# Patient Record
Sex: MALE | Race: OTHER | HISPANIC OR LATINO | URBAN - METROPOLITAN AREA
[De-identification: names, ages, dates, MRNs, and addresses within clinical notes are randomized per-mention and may not be internally consistent; named-entity substitution may affect disease eponyms.]

---

## 2023-05-01 ENCOUNTER — EMERGENCY (EMERGENCY)
Facility: HOSPITAL | Age: 53
LOS: 1 days | Discharge: HOME | End: 2023-05-01
Attending: EMERGENCY MEDICINE
Payer: COMMERCIAL

## 2023-05-01 VITALS
TEMPERATURE: 98 F | SYSTOLIC BLOOD PRESSURE: 190 MMHG | WEIGHT: 210.1 LBS | HEART RATE: 66 BPM | HEIGHT: 67 IN | OXYGEN SATURATION: 98 % | DIASTOLIC BLOOD PRESSURE: 116 MMHG | RESPIRATION RATE: 18 BRPM

## 2023-05-01 VITALS
HEART RATE: 76 BPM | SYSTOLIC BLOOD PRESSURE: 194 MMHG | RESPIRATION RATE: 16 BRPM | DIASTOLIC BLOOD PRESSURE: 137 MMHG | OXYGEN SATURATION: 96 %

## 2023-05-01 LAB
ALBUMIN SERPL ELPH-MCNC: 4.6 G/DL — SIGNIFICANT CHANGE UP (ref 3.3–5)
ALP SERPL-CCNC: 55 U/L — SIGNIFICANT CHANGE UP (ref 40–120)
ALT FLD-CCNC: 22 U/L — SIGNIFICANT CHANGE UP (ref 10–45)
ANION GAP SERPL CALC-SCNC: 12 MMOL/L — SIGNIFICANT CHANGE UP (ref 5–17)
APTT BLD: 28.5 SEC — SIGNIFICANT CHANGE UP (ref 27.5–35.5)
AST SERPL-CCNC: 24 U/L — SIGNIFICANT CHANGE UP (ref 10–40)
BASOPHILS # BLD AUTO: 0.07 K/UL — SIGNIFICANT CHANGE UP (ref 0–0.2)
BASOPHILS NFR BLD AUTO: 0.7 % — SIGNIFICANT CHANGE UP (ref 0–2)
BILIRUB SERPL-MCNC: 0.6 MG/DL — SIGNIFICANT CHANGE UP (ref 0.2–1.2)
BUN SERPL-MCNC: 18 MG/DL — SIGNIFICANT CHANGE UP (ref 7–23)
CALCIUM SERPL-MCNC: 9.9 MG/DL — SIGNIFICANT CHANGE UP (ref 8.4–10.5)
CHLORIDE SERPL-SCNC: 102 MMOL/L — SIGNIFICANT CHANGE UP (ref 96–108)
CO2 SERPL-SCNC: 27 MMOL/L — SIGNIFICANT CHANGE UP (ref 22–31)
CREAT SERPL-MCNC: 1.08 MG/DL — SIGNIFICANT CHANGE UP (ref 0.5–1.3)
EGFR: 83 ML/MIN/1.73M2 — SIGNIFICANT CHANGE UP
EOSINOPHIL # BLD AUTO: 0.18 K/UL — SIGNIFICANT CHANGE UP (ref 0–0.5)
EOSINOPHIL NFR BLD AUTO: 1.8 % — SIGNIFICANT CHANGE UP (ref 0–6)
GLUCOSE SERPL-MCNC: 98 MG/DL — SIGNIFICANT CHANGE UP (ref 70–99)
HCT VFR BLD CALC: 46.2 % — SIGNIFICANT CHANGE UP (ref 39–50)
HGB BLD-MCNC: 16.3 G/DL — SIGNIFICANT CHANGE UP (ref 13–17)
IMM GRANULOCYTES NFR BLD AUTO: 0.5 % — SIGNIFICANT CHANGE UP (ref 0–0.9)
INR BLD: 1.09 RATIO — SIGNIFICANT CHANGE UP (ref 0.88–1.16)
LYMPHOCYTES # BLD AUTO: 1.82 K/UL — SIGNIFICANT CHANGE UP (ref 1–3.3)
LYMPHOCYTES # BLD AUTO: 18.7 % — SIGNIFICANT CHANGE UP (ref 13–44)
MCHC RBC-ENTMCNC: 31.4 PG — SIGNIFICANT CHANGE UP (ref 27–34)
MCHC RBC-ENTMCNC: 35.3 GM/DL — SIGNIFICANT CHANGE UP (ref 32–36)
MCV RBC AUTO: 89 FL — SIGNIFICANT CHANGE UP (ref 80–100)
MONOCYTES # BLD AUTO: 0.85 K/UL — SIGNIFICANT CHANGE UP (ref 0–0.9)
MONOCYTES NFR BLD AUTO: 8.7 % — SIGNIFICANT CHANGE UP (ref 2–14)
NEUTROPHILS # BLD AUTO: 6.76 K/UL — SIGNIFICANT CHANGE UP (ref 1.8–7.4)
NEUTROPHILS NFR BLD AUTO: 69.6 % — SIGNIFICANT CHANGE UP (ref 43–77)
NRBC # BLD: 0 /100 WBCS — SIGNIFICANT CHANGE UP (ref 0–0)
PLATELET # BLD AUTO: 312 K/UL — SIGNIFICANT CHANGE UP (ref 150–400)
POTASSIUM SERPL-MCNC: 3.7 MMOL/L — SIGNIFICANT CHANGE UP (ref 3.5–5.3)
POTASSIUM SERPL-SCNC: 3.7 MMOL/L — SIGNIFICANT CHANGE UP (ref 3.5–5.3)
PROT SERPL-MCNC: 7.9 G/DL — SIGNIFICANT CHANGE UP (ref 6–8.3)
PROTHROM AB SERPL-ACNC: 12.6 SEC — SIGNIFICANT CHANGE UP (ref 10.5–13.4)
RBC # BLD: 5.19 M/UL — SIGNIFICANT CHANGE UP (ref 4.2–5.8)
RBC # FLD: 12.9 % — SIGNIFICANT CHANGE UP (ref 10.3–14.5)
SODIUM SERPL-SCNC: 141 MMOL/L — SIGNIFICANT CHANGE UP (ref 135–145)
WBC # BLD: 9.73 K/UL — SIGNIFICANT CHANGE UP (ref 3.8–10.5)
WBC # FLD AUTO: 9.73 K/UL — SIGNIFICANT CHANGE UP (ref 3.8–10.5)

## 2023-05-01 PROCEDURE — 85730 THROMBOPLASTIN TIME PARTIAL: CPT

## 2023-05-01 PROCEDURE — 99285 EMERGENCY DEPT VISIT HI MDM: CPT

## 2023-05-01 PROCEDURE — 80053 COMPREHEN METABOLIC PANEL: CPT

## 2023-05-01 PROCEDURE — 36415 COLL VENOUS BLD VENIPUNCTURE: CPT

## 2023-05-01 PROCEDURE — 93005 ELECTROCARDIOGRAM TRACING: CPT

## 2023-05-01 PROCEDURE — 93971 EXTREMITY STUDY: CPT

## 2023-05-01 PROCEDURE — 99285 EMERGENCY DEPT VISIT HI MDM: CPT | Mod: 25

## 2023-05-01 PROCEDURE — 93971 EXTREMITY STUDY: CPT | Mod: 26,RT

## 2023-05-01 PROCEDURE — 85610 PROTHROMBIN TIME: CPT

## 2023-05-01 PROCEDURE — 85025 COMPLETE CBC W/AUTO DIFF WBC: CPT

## 2023-05-01 RX ORDER — LOSARTAN POTASSIUM 100 MG/1
1 TABLET, FILM COATED ORAL
Qty: 30 | Refills: 0
Start: 2023-05-01 | End: 2023-05-30

## 2023-05-01 RX ORDER — AMLODIPINE BESYLATE 2.5 MG/1
5 TABLET ORAL ONCE
Refills: 0 | Status: COMPLETED | OUTPATIENT
Start: 2023-05-01 | End: 2023-05-01

## 2023-05-01 RX ORDER — LOSARTAN POTASSIUM 100 MG/1
50 TABLET, FILM COATED ORAL DAILY
Refills: 0 | Status: DISCONTINUED | OUTPATIENT
Start: 2023-05-01 | End: 2023-05-05

## 2023-05-01 RX ORDER — AMLODIPINE BESYLATE 2.5 MG/1
1 TABLET ORAL
Qty: 30 | Refills: 0
Start: 2023-05-01 | End: 2023-05-30

## 2023-05-01 RX ADMIN — LOSARTAN POTASSIUM 50 MILLIGRAM(S): 100 TABLET, FILM COATED ORAL at 18:51

## 2023-05-01 RX ADMIN — AMLODIPINE BESYLATE 5 MILLIGRAM(S): 2.5 TABLET ORAL at 18:51

## 2023-05-01 NOTE — ED PROVIDER NOTE - ATTENDING CONTRIBUTION TO CARE
Patient is a 52-year-old male with a history of hypertension, not on any medication for the past 2 years (states his former medication started with an "N") here for evaluation of 3 days of right lower extremity swelling and pain.  Patient denies any trauma or falls.  He states he has pain around his knee and now in his calf.  He denies any chest pain or shortness of breath.  About 1 week ago he had right lower back pain and thought it was sciatica.  He denies any abdominal pain.  No fevers, chills, nausea, vomiting.    VS noted  Gen. no acute distress, Non toxic   HEENT: EOMI, mmm  Spine: No C-T-L midline spine tenderness, no paraspinal tenderness  Lungs: CTAB/L no C/ W /R   CVS: RRR   Abd; Soft non tender, non distended, no CVA tenderness  Ext: RLE: swelling to lower leg, no erythema or warmth, ttp in calf and behind knee, palpable DP pulse  Skin: no rash  Neuro AAOx3 non focal clear speech  a/p: RLE swelling. Differential includes DVT, Baker's cyst. Plan for labs, ekg, RLE doppler to r/o DVT.   - Zenaida MCGOWAN Patient is a 52-year-old male with a history of hypertension, not on any medication for the past 2 years (states his former medication started with an "N") here for evaluation of 3 days of right lower extremity swelling and pain.  Patient denies any trauma or falls.  He states he has pain around his knee and now in his calf.  He denies any chest pain or shortness of breath.  About 1 week ago he had right lower back pain and thought it was sciatica.  He denies any abdominal pain.  No fevers, chills, nausea, vomiting.    VS noted  Gen. no acute distress, Non toxic   HEENT: EOMI, mmm  Spine: No C-T-L midline spine tenderness, no paraspinal tenderness  Lungs: CTAB/L no C/ W /R   CVS: RRR   Abd; Soft non tender, non distended, no CVA tenderness  Ext: RLE: swelling to lower leg, no erythema or warmth, ttp in calf and behind knee, palpable DP pulse  Skin: no rash  Neuro AAOx3 non focal clear speech  a/p: RLE swelling. Differential includes DVT, Baker's cyst. Plan for labs, ekg, RLE doppler to r/o DVT.     Update: No DVT found on Doppler. As no etiology found for RLE swelling. CT A/P ordered to evaluate for etiology causing RLE swelling. Patient initially agreed but then decided that he would prefer to follow up outpatient.   - Zenaida MCGOWAN

## 2023-05-01 NOTE — ED ADULT NURSE NOTE - OBJECTIVE STATEMENT
51 y/o M with PMH of HTN, been off HTN meds for 2 years, presents to ED complaining of right lower extremity edema. Pt reports originally having pain in the right lower back on wednesday, now experiencing pain in right lower extremity associated with edema and warmth. Pt also had pain in his right calf. [place]. Pt denies any shortness of breath or chest pain. Pt went to urgent care and was sent to ED due to pt being hypertensive. Upon arrival, pt is hypertensive. Right knee is swollen and calf is tender upon palpation. Denies headache, dizziness, vision changes, chest pain, shortness of breath, abdominal pain, nausea, vomiting, diarrhea, fevers, chills, dysuria, hematuria, recent illness or fall.

## 2023-05-01 NOTE — ED PROVIDER NOTE - EKG ADDITIONAL QUESTION - PERFORMED INDEPENDENT VISUALIZATION
Received signed and completed form from Physician's Office.    Complete form was mailed to patient at address in demographics (phone number has a strange google voice device and did not ring).   Yes

## 2023-05-01 NOTE — ED PROVIDER NOTE - NSFOLLOWUPINSTRUCTIONS_ED_ALL_ED_FT
You were seen in the Emergency Department for leg swelling. Your blood work and ultrasound did not show any emergent medical problems that require hospitalization or inpatient treatment. Follow up with your primary care doctor as discussed.     1) Continue all previously prescribed medications as directed.    2) Follow up with your primary care physician - take copies of your results.    3) Return to the Emergency Department for worsening or persistent symptoms, and/or ANY NEW OR CONCERNING SYMPTOMS.

## 2023-05-01 NOTE — ED PROVIDER NOTE - OBJECTIVE STATEMENT
52-year-old male history of hypertension and has not had recent contact with his PCP here for few days of right lower extremity swelling and pain.  Initially thought is related to his knee but now his calf is swollen too.  He describes having pain when he squeezes his calf.  Able to ambulate.  There is no trauma.  He has no fever or redness of the skin.  There are no rashes.  No recent illnesses no chest pain or shortness of breath.  Has not been taking anything for his blood pressure.  No recent travel or history of blood clots or long periods of sitting.  No known cancer history.

## 2023-05-01 NOTE — ED PROVIDER NOTE - NSFOLLOWUPCLINICS_GEN_ALL_ED_FT
[facility] General Internal Medicine  General Internal Medicine  [address] [place] [address]  Phone: [phone]  Fax:

## 2023-05-01 NOTE — ED PROVIDER NOTE - PATIENT PORTAL LINK FT
You can access the FollowMyHealth Patient Portal offered by [facility] by registering at the following website: [email or web address]. By joining [employer]’s FollowMyHealth portal, you will also be able to view your health information using other applications (apps) compatible with our system.

## 2023-05-01 NOTE — ED PROVIDER NOTE - PHYSICAL EXAMINATION
On examination patient is well-appearing of stated age, regular rate rhythm, clear lungs, abdomen soft nontender, lower extremities with right greater than left in size.  There is calf tenderness on the right.  There is no effusion of his knee or limitations of range of motion.

## 2023-05-01 NOTE — ED PROVIDER NOTE - CLINICAL SUMMARY MEDICAL DECISION MAKING FREE TEXT BOX
Patient here for right lower extremity swelling.  His vital signs are stable aside from hypertension.  Anticipate this is around his baseline and he does not appear to have any symptoms suggesting hypertensive emergency.  His right lower extremity is concerning for DVT though possibly unprovoked.  Will obtain DVT study and labs.

## 2023-05-01 NOTE — ED PROVIDER NOTE - PROGRESS NOTE DETAILS
Zenaida MCGOWAN: I called the pharmacy patient used to go to:   [facility] [place] [facility]  [facility], [address] [place] [address], [id number], [phone]    Per pharmacist, patient last filled his prescription in 2020 and was on amlodipine 5 mg 1 tab once a day, losartan 50 mg once a day SANDOR Lopes (PGY-3) - patient no DVT on ultrasound.  His labs are otherwise unremarkable.  Low suspicion is low was planning to do CT scan abdomen pelvis with IV contrast to look for vascular occlusion.  Patient states that he cannot stay because he must take care of his 2 young children.  He states that he can get the CT scan tomorrow here in the ER or or he will follow-up with a primary care doctor and get an outpatient CT scan.  Explained return precautions with the patient including worsening symptoms, abdominal pain, syncope, chest pain, shortness of breath.  Patient understands precautions.  Will make referral for outpatient primary care.